# Patient Record
Sex: FEMALE | Race: WHITE | NOT HISPANIC OR LATINO
[De-identification: names, ages, dates, MRNs, and addresses within clinical notes are randomized per-mention and may not be internally consistent; named-entity substitution may affect disease eponyms.]

---

## 2021-05-04 PROBLEM — Z00.00 ENCOUNTER FOR PREVENTIVE HEALTH EXAMINATION: Status: ACTIVE | Noted: 2021-05-04

## 2021-05-05 ENCOUNTER — APPOINTMENT (OUTPATIENT)
Dept: NEUROLOGY | Facility: CLINIC | Age: 71
End: 2021-05-05
Payer: MEDICARE

## 2021-05-05 VITALS
HEIGHT: 68 IN | DIASTOLIC BLOOD PRESSURE: 72 MMHG | SYSTOLIC BLOOD PRESSURE: 130 MMHG | TEMPERATURE: 97.2 F | BODY MASS INDEX: 18.94 KG/M2 | WEIGHT: 125 LBS

## 2021-05-05 DIAGNOSIS — G62.9 POLYNEUROPATHY, UNSPECIFIED: ICD-10-CM

## 2021-05-05 DIAGNOSIS — R20.2 PARESTHESIA OF SKIN: ICD-10-CM

## 2021-05-05 PROCEDURE — 99204 OFFICE O/P NEW MOD 45 MIN: CPT

## 2021-05-05 RX ORDER — TURMERIC ROOT EXTRACT 500 MG
TABLET ORAL
Refills: 0 | Status: ACTIVE | COMMUNITY

## 2021-05-26 ENCOUNTER — APPOINTMENT (OUTPATIENT)
Dept: NEUROLOGY | Facility: CLINIC | Age: 71
End: 2021-05-26
Payer: MEDICARE

## 2021-05-26 PROCEDURE — 95910 NRV CNDJ TEST 7-8 STUDIES: CPT

## 2021-05-26 PROCEDURE — 95886 MUSC TEST DONE W/N TEST COMP: CPT

## 2021-07-01 ENCOUNTER — NON-APPOINTMENT (OUTPATIENT)
Age: 71
End: 2021-07-01

## 2021-07-01 ENCOUNTER — APPOINTMENT (OUTPATIENT)
Dept: ORTHOPEDIC SURGERY | Facility: CLINIC | Age: 71
End: 2021-07-01
Payer: MEDICARE

## 2021-07-01 VITALS
HEIGHT: 68 IN | WEIGHT: 125 LBS | BODY MASS INDEX: 18.94 KG/M2 | DIASTOLIC BLOOD PRESSURE: 79 MMHG | HEART RATE: 74 BPM | SYSTOLIC BLOOD PRESSURE: 124 MMHG

## 2021-07-01 PROCEDURE — 73590 X-RAY EXAM OF LOWER LEG: CPT | Mod: RT

## 2021-07-01 PROCEDURE — 73630 X-RAY EXAM OF FOOT: CPT | Mod: RT

## 2021-07-01 PROCEDURE — 99204 OFFICE O/P NEW MOD 45 MIN: CPT

## 2021-07-01 RX ORDER — TURM/GING/BOS/YUC/WIL/CHAM/HOR 100-100 MG
TABLET ORAL
Refills: 0 | Status: ACTIVE | COMMUNITY

## 2021-07-01 NOTE — PHYSICAL EXAM
[de-identified] : General: Alert and oriented x3. In no acute distress. Pleasant in nature with a normal affect. No apparent respiratory distress.\par Gait: compensating with medial walking. \par \par Right Foot\par Skin: Clean, dry, intact\par Inspection: No obvious malalignment, no masses, no swelling, no effusion\par Pulses: 2+ DP/PT pulses\par ROM: FOOT Full  ROM of digits, ANKLE 10 degrees of dorsiflexion, 40 degrees of plantarflexion, 10 degrees of subtalar motion.\par Painful ROM: None\par Tenderness: + 4th, 5th metatarsal pain. No tenderness over the medial malleolus, No tenderness over the lateral malleolus, no CFL/ATFL/PTFL pain, no deltoid ligament pain. No heel pain. No Achilles tenderness. No pain to the LisFranc joint. No ttp over the posterior tibial tendon.\par Stability: Negative anterior/posterior drawer.\par Strength: 5/5 ADD/ABD/TA/GS/EHL/FHL/EDL\par Neuro: Sensation in tact to light touch throughout\par Additional tests: Negative Mortons test, negative tarsal tunnel tinels, negative single heel rise. \par \par Right Knee Exam\par Skin: Clean, dry, intact\par Inspection: No obvious malalignment, no masses, no swelling, no effusion \par Pulses: 2+ DP/PT pulses\par ROM: 0-130 degrees of flexion. No pain with deep knee flexion.\par Tenderness: No MJLT. No LJLT. No pain over the patella facets. No pain to the quadriceps mechanism.\par Stability: Stable to varus, valgus, Lachman testing. Negative anterior/posterior drawer.\par Strength: 5/5 Q/H/TA/GS/EHL, no atrophy\par Neuro: In tact to light touch throughout, DTR's normal\par Additional tests: Negative McMurrays test, Negative patellar grind test. [de-identified] : 3V of the right foot were ordered, obtained, and reviewed by me today, 07/01/2021 , which revealed: no fractures\par \par 2V of the tib fib were ordered, obtained, and reviewed by me today, 07/01/2021 , which revealed: no fractures

## 2021-07-01 NOTE — DISCUSSION/SUMMARY
[de-identified] : Today I had a lengthy discussion with the patient regarding their right foot pain. I have addressed all the patient's concerns surrounding the pathology of their condition. XR films were reviewed with the patient. \par \par I recommend the patient undergo a course of physical therapy for the right foot  2-3 times a week for a total of 6-8 weeks. A prescription was given for the physical therapy today. I recommend that the patient utilize ice, heat, NSAIDs prn. They can also elevate their right foot above the level of the heart. \par \par I would like to see the patient back in the office in 6-8 weeks, prn to reassess their condition. The patient understood and verbally agreed to the treatment plan. All of their questions were answered and they were satisfied with the visit. The patient should call the office if they have any questions or experience worsening symptoms.

## 2021-07-01 NOTE — ADDENDUM
[FreeTextEntry1] : I, Zen Gutierrez, acted solely as a scribe for Dr. Clifton Funes on this date 07/01/2021  .\par  \par All medical record entries made by the Scribe were at my, Dr. Clifton Funes, direction and personally dictated by me on 07/01/2021 . I have reviewed the chart and agree that the record accurately reflects my personal performance of the history, physical exam, assessment and plan. I have also personally directed, reviewed, and agreed with the chart.

## 2021-07-01 NOTE — HISTORY OF PRESENT ILLNESS
[FreeTextEntry1] : WILEY LAGUERRE is a 70 year old female who presents for initial evaluation of right foot pain. She developed sharp pain in her knees after running up and down stairs while traveling 10 years ago. She was evaluated for arthritis in her knees. She states she had relief with physical therapy. During her sessions she develop pain in her foot.  She had a negative EMG study done on her foot. She states the pain has worsened over the past 3 months. She has tried Voltaren gel. She is using Dr. Goel's orthotics.

## 2021-07-22 ENCOUNTER — APPOINTMENT (OUTPATIENT)
Dept: ORTHOPEDIC SURGERY | Facility: CLINIC | Age: 71
End: 2021-07-22
Payer: MEDICARE

## 2021-07-22 DIAGNOSIS — M79.604 PAIN IN RIGHT LEG: ICD-10-CM

## 2021-07-22 PROCEDURE — 99214 OFFICE O/P EST MOD 30 MIN: CPT

## 2021-07-22 NOTE — DISCUSSION/SUMMARY
[de-identified] : Today I had a lengthy discussion with the patient regarding their right foot pain.I have addressed all the patient's concerns surrounding the pathology of their condition. I referred the patient to a physiatrist and vascular providers. A discussion was had about utilizing custom orthotics. The patient was educated about custom orthotics in the office today. I recommend that the patient utilize ice, NSAIDS PRN, and heat. They can also elevate their right foot above the level of the heart.		\par \par The patient understood and verbally agreed to the treatment plan. All of their questions were answered and they were satisfied with the visit. The patient should call the office if they have any questions or experience worsening symptoms. I would like to see the patient back in the office in prn to reassess their condition.

## 2021-07-22 NOTE — HISTORY OF PRESENT ILLNESS
[FreeTextEntry1] : 7/22/21: WILEY LAGUERRE is a 70 year year old female presenting for a follow-up evaluation of right foot pain. She has been attending physical therapy. She reports that her pain has not improved. When she is active, she reports minimal pain. However, when she is sitting, she reports a sharp pain.  		\par \par 7/1/21: WILEY LAGUERRE is a 70 year old female who presents for initial evaluation of right foot pain. She developed sharp pain in her knees after running up and down stairs while traveling 10 years ago. She was evaluated for arthritis in her knees. She states she had relief with physical therapy. During her sessions she develop pain in her foot.  She had a negative EMG study done on her foot. She states the pain has worsened over the past 3 months. She has tried Voltaren gel. She is using Dr. Goel's orthotics.

## 2021-07-22 NOTE — PHYSICAL EXAM
[de-identified] : General: Alert and oriented x3. In no acute distress. Pleasant in nature with a normal affect. No apparent respiratory distress.\par Gait: compensating with medial walking. \par \par Right Foot\par Skin: Clean, dry, intact\par Inspection: No obvious malalignment, no masses, no swelling, no effusion\par Pulses: 2+ DP/PT pulses\par ROM: FOOT Full  ROM of digits, ANKLE 10 degrees of dorsiflexion, 40 degrees of plantarflexion, 10 degrees of subtalar motion.\par Painful ROM: None\par Tenderness: + 4th, 5th metatarsal pain. No tenderness over the medial malleolus, No tenderness over the lateral malleolus, no CFL/ATFL/PTFL pain, no deltoid ligament pain. No heel pain. No Achilles tenderness. No pain to the LisFranc joint. No ttp over the posterior tibial tendon.\par Stability: Negative anterior/posterior drawer.\par Strength: 5/5 ADD/ABD/TA/GS/EHL/FHL/EDL\par Neuro: Sensation in tact to light touch throughout\par Additional tests: Negative Mortons test, negative tarsal tunnel tinels, negative single heel rise. \par \par Right Knee Exam\par Skin: Clean, dry, intact\par Inspection: No obvious malalignment, no masses, no swelling, no effusion \par Pulses: 2+ DP/PT pulses\par ROM: 0-130 degrees of flexion. No pain with deep knee flexion.\par Tenderness: No MJLT. No LJLT. No pain over the patella facets. No pain to the quadriceps mechanism.\par Stability: Stable to varus, valgus, Lachman testing. Negative anterior/posterior drawer.\par Strength: 5/5 Q/H/TA/GS/EHL, no atrophy\par Neuro: In tact to light touch throughout, DTR's normal\par Additional tests: Negative McMurrays test, Negative patellar grind test. [de-identified] : MRI of right calf without contrast done on 07/15/2021 at [facility] results reviewed 7/22/21, results as reported: \par \par Impressions:\par 1: Unremarkable right calf. Limited imaging of the knee reveals a tear of the medial meniscus posterior horn. \par \par Signed by: Fuentes Munson MD 7/20/21 11:32 PM EDT\par \par \par MRI of right foot done on 07/15/2021 at [facility] results reviewed 7/22/21, results as reported: \par \par Impressions: \par 1: Hallux Valgus with moderate to severe osteoarthrosis at the first MTP joint.\par 2: There is no stress fracture\par 3: Severe arthrosis at the medial metatarsosesamoid joint. There is a bipartite tibial hallux sesamoid bone with mild sesamoiditis.\par 4: Partial Thickness tear of the second MTP plantar plate involving the medial fibers.\par \par Signed by: Fuentes Munson MD 7/20/21 11:30 PM EDT

## 2021-07-22 NOTE — ADDENDUM
[FreeTextEntry1] : I, Andreia Portillo, acted solely as a scribe for Dr. Clifton Funes on this date 07/22/2021.\par \par All medical record entries made by the Scribe were at my, Dr. Clifton Funes, direction and personally dictated by me on 07/22/2021 . I have reviewed the chart and agree that the record accurately reflects my personal performance of the history, physical exam, assessment and plan. I have also personally directed, reviewed, and agreed with the chart.	\par

## 2021-08-20 ENCOUNTER — APPOINTMENT (OUTPATIENT)
Dept: VASCULAR SURGERY | Facility: CLINIC | Age: 71
End: 2021-08-20
Payer: MEDICARE

## 2021-08-20 VITALS
BODY MASS INDEX: 18.52 KG/M2 | WEIGHT: 118 LBS | TEMPERATURE: 97.7 F | SYSTOLIC BLOOD PRESSURE: 149 MMHG | DIASTOLIC BLOOD PRESSURE: 71 MMHG | HEIGHT: 67 IN | OXYGEN SATURATION: 97 % | HEART RATE: 58 BPM | RESPIRATION RATE: 16 BRPM

## 2021-08-20 PROCEDURE — 99203 OFFICE O/P NEW LOW 30 MIN: CPT

## 2021-08-20 NOTE — HISTORY OF PRESENT ILLNESS
[FreeTextEntry1] : 70 year old woman referred for evaluation of right foot and calf pain.\par Patient reports that 10 years ago she suffered a stress fracture of her right 4th metatarsal.\par Over the last few months she has been experiencing severe pain in the same location, worsened by standing and walking. She also reports trigger-point tenderness on the lateral aspect of her right calf.\par Recent MRI of her right foot demonstrates no new fractures.\par Patient denies intermittent claudication.\par She denies lower extremity swelling\par She does not smoke

## 2021-08-20 NOTE — ASSESSMENT
[FreeTextEntry1] : 70 year old female with right foot and lateral thigh pain.\par Right foot pain is at the site of a previous metatarsal injury. Right lateral calf pain is elicited by pressing on the area, but not by walking.\par Her symptoms are not consistent with vasculogenic claudication.\par Her pedal pulses are strongly palpable bilaterally\par -She has no clinical evidence of underlying PAD\par -Symptoms are not vascular in nature, likely musculoskeletal\par -No further vascular work-up warranted. Plan discussed with patient, all questions answered

## 2021-08-20 NOTE — PHYSICAL EXAM
[JVD] : no jugular venous distention  [Normal Breath Sounds] : Normal breath sounds [Normal Rate and Rhythm] : normal rate and rhythm [2+] : left 2+ [Varicose Veins Of Lower Extremities] : not present [Ankle Swelling (On Exam)] : not present [Abdomen Masses] : Abdomen masses present [] : not present [Abdomen Tenderness] : ~T ~M Abdominal tenderness [Abdominal Bruit] : abdominal bruit present [No Rash or Lesion] : No rash or lesion [Alert] : alert [Oriented to Person] : oriented to person [Oriented to Place] : oriented to place [Oriented to Time] : oriented to time [Calm] : calm [de-identified] : Well appearing [de-identified] : NCAT

## 2021-09-16 ENCOUNTER — APPOINTMENT (OUTPATIENT)
Dept: PHYSICAL MEDICINE AND REHAB | Facility: CLINIC | Age: 71
End: 2021-09-16
Payer: MEDICARE

## 2021-09-16 VITALS
DIASTOLIC BLOOD PRESSURE: 74 MMHG | WEIGHT: 120 LBS | BODY MASS INDEX: 18.19 KG/M2 | HEIGHT: 68 IN | RESPIRATION RATE: 14 BRPM | SYSTOLIC BLOOD PRESSURE: 150 MMHG | HEART RATE: 60 BPM

## 2021-09-16 DIAGNOSIS — Z78.9 OTHER SPECIFIED HEALTH STATUS: ICD-10-CM

## 2021-09-16 DIAGNOSIS — M76.821 POSTERIOR TIBIAL TENDINITIS, RIGHT LEG: ICD-10-CM

## 2021-09-16 PROCEDURE — 99204 OFFICE O/P NEW MOD 45 MIN: CPT

## 2021-09-16 NOTE — ASSESSMENT
[FreeTextEntry1] : 70 y.o. F w/ h/o chronic right ankle and foot pain.  I spent most of today's office visit (35 min) reviewing the patient's MRI, discussing pathogenesis and further non-operative management.  DDx for lateral ankle/foot pain includes metatarsalgia vs. peroneal tendinosis.  DDx for medial ankle pain includes post. tib tendinosis.  However, MRI ankle July 2021 did not show tenosynovitis or tear.  Pt.'s right hallux valgus and bunion appear asymptomatic. 2nd metatarsal plantar plate tear ? symptomatic.  Pt. already has CMOs and metatarsal pads which are not comfortable.  No EDX evidence of large fiber neuropathy affecting LE.  Discussed utility of US examination of peroneal and post tib tendons w/ possible LA block.  Pt. is in agreement with plan.  All questions answered.  RTC for US.

## 2021-09-16 NOTE — HISTORY OF PRESENT ILLNESS
[FreeTextEntry1] : 70 y.o. F presents to office w/ c/o chronic right lateral foot pain.  Pain initially began after stress fx 4th metatarsal about 10 years ago.  Pt. subsequently developed chronic lateral foot pain for several years which then abated about 2-3 years w/ periodic flare-ups.  Pt. states that she was being treated for right knee pain in P.T. this past March when her right foot began to hurt again.  Pt. consulted Dr. Funes and vascular MD.  Imaging done and reviewed below.  EMG b/l LE neg.  Pt. states that her CMO don't fit in shoes.  Last injection 2014.  Has tried metatarsal pads which are not comfortable.  Has tried Cymbalta in past but could not tolerate 2' GI side effects.

## 2021-09-16 NOTE — PHYSICAL EXAM
[FreeTextEntry1] : NAD\par A&Ox3.  Anxious\par Non-obese\par Inspection Right Ankle\par No joint swelling\par Right Halux Valgus w/ bunion deformity\par Right 2nd claw toe\par ROM: 15-20' DF; 40' PF\par No subtalar hyperpronation\par Drawer Test: neg\par Talar Tilt: neg\par Palpation\par Talar dome: NTTP\par Sinus tarsi: mild TTP\par ATFL: NTTP\par CFL: NTTP\par DISTAL 1/3 ACHILLES TENDON: NTTP\par PL/PB TENDONS: VTTP\par PTIB TENDON: MILD TTP\par METATARSAL SQUEEZE TEST + w/ pain in hallux\par MMT: 4+/5 EVERSION\par SILT.  No decrement to PP

## 2021-09-16 NOTE — DATA REVIEWED
[MRI] : MRI [FreeTextEntry1] : MRI Right Foot: Hallux valgus with moderate to severe osteoarthrosis at the first MTP joint.  Severe arthrosis at the medial metatarsosesamoidal joint. There is a bipartite tibial hallux sesamoid bone with mild sesamoiditis.  Partial-thickness tear of the second MTP plantar plate involving the medial fibers.  There is no stress fracture.  There is no tendon tear, tendinosis, or tenosynovitis. There is no muscle atrophy or intramuscular edema.

## 2021-09-20 ENCOUNTER — TRANSCRIPTION ENCOUNTER (OUTPATIENT)
Age: 71
End: 2021-09-20

## 2021-09-27 ENCOUNTER — APPOINTMENT (OUTPATIENT)
Dept: PHYSICAL MEDICINE AND REHAB | Facility: CLINIC | Age: 71
End: 2021-09-27
Payer: MEDICARE

## 2021-09-27 PROCEDURE — 76882 US LMTD JT/FCL EVL NVASC XTR: CPT | Mod: RT

## 2021-09-27 PROCEDURE — 99214 OFFICE O/P EST MOD 30 MIN: CPT | Mod: 25

## 2021-09-27 NOTE — ASSESSMENT
[FreeTextEntry1] : 71 y.o. F w/ h/o chronic right ankle and foot pain.  I spent most of today's office visit (25 min) reviewing and performing the patient's MSK US examination right lateral ankle, discussing pathogenesis and further non-operative management.  DDx for lateral ankle/foot pain includes peroneal tendinosis +/- 4th/5th metatarsalgia.  Not much medial ankle foot pain but DDx includes post. tib tendinosis.  However, MRI ankle July 2021 did not show tenosynovitis or tear.  Pt.'s right hallux valgus and bunion appear asymptomatic.  2nd metatarsal plantar plate tear ? symptomatic.  Pt. already has CMOs and metatarsal pads which are not comfortable.  No EDX evidence of large fiber neuropathy affecting LE.  Discussed R/B/A to US guided right ankle peroneal tendon LA block.  If pt. achieves > 70-80% pain relief post procedure will then consider regenerative medicine procedure (ie, PRP injection).  Pt. is in agreement with plan.  All questions answered.  RTC for LA injection.

## 2021-09-27 NOTE — HISTORY OF PRESENT ILLNESS
[FreeTextEntry1] : 71 y.o. F w/ h/o chronic right ankle and foot pain returns to office for MSK US examination right ankle.  Pt. reports that her right ankle/foot pain is now mostly lateral and distal to ankle.

## 2021-09-27 NOTE — PHYSICAL EXAM
[FreeTextEntry1] : NAD\par A&Ox3.  Anxious\par Non-obese\par Inspection Right Ankle\par No significant change in today's examination\par No joint swelling\par Right Halux Valgus w/ bunion deformity\par Right 2nd claw toe\par ROM: 15-20' DF; 40' PF\par No subtalar hyperpronation\par Drawer Test: neg\par Talar Tilt: neg\par Palpation\par Talar dome: NTTP\par Sinus tarsi: mild TTP\par ATFL: NTTP\par CFL: NTTP\par DISTAL 1/3 ACHILLES TENDON: NTTP\par PL/PB TENDONS: VTTP\par PTIB TENDON: MILD TTP\par METATARSAL SQUEEZE TEST + w/ pain in hallux\par MMT: 4+/5 EVERSION\par SILT.  No decrement to PP

## 2021-09-27 NOTE — PROCEDURE
[de-identified] : MSK US EXAMINATION RIGHT ANKLE\par \par LATERAL\par * PERONEUS LONGUS & BREVIS TENDONS APPEAR SOMEWHAT HYPOECHOIC W/ DISORGANIZED FIBERS\par * NO DEMONSTRABLE FLUID FILLED DEFECT OR TEAR\par * CONCORDANT PAIN SIGHTLY PROXIMAL TO AND DISTAL TO LATERAL MALLEOLUS WITH SONOPALPATION\par \par

## 2021-10-01 ENCOUNTER — APPOINTMENT (OUTPATIENT)
Dept: PHYSICAL MEDICINE AND REHAB | Facility: CLINIC | Age: 71
End: 2021-10-01
Payer: MEDICARE

## 2021-10-01 VITALS
DIASTOLIC BLOOD PRESSURE: 82 MMHG | BODY MASS INDEX: 18.19 KG/M2 | RESPIRATION RATE: 14 BRPM | HEIGHT: 68 IN | WEIGHT: 120 LBS | SYSTOLIC BLOOD PRESSURE: 151 MMHG | HEART RATE: 80 BPM

## 2021-10-01 DIAGNOSIS — M79.671 PAIN IN RIGHT FOOT: ICD-10-CM

## 2021-10-01 PROCEDURE — 20550 NJX 1 TENDON SHEATH/LIGAMENT: CPT | Mod: RT

## 2021-10-13 ENCOUNTER — NON-APPOINTMENT (OUTPATIENT)
Age: 71
End: 2021-10-13

## 2021-10-18 ENCOUNTER — APPOINTMENT (OUTPATIENT)
Dept: PHYSICAL MEDICINE AND REHAB | Facility: CLINIC | Age: 71
End: 2021-10-18
Payer: SELF-PAY

## 2021-10-18 PROCEDURE — 0232T NJX PLATELET PLASMA: CPT | Mod: RT

## 2021-11-03 ENCOUNTER — APPOINTMENT (OUTPATIENT)
Dept: PHYSICAL MEDICINE AND REHAB | Facility: CLINIC | Age: 71
End: 2021-11-03
Payer: MEDICARE

## 2021-11-03 VITALS
WEIGHT: 120 LBS | HEART RATE: 84 BPM | TEMPERATURE: 97.1 F | DIASTOLIC BLOOD PRESSURE: 84 MMHG | HEIGHT: 68 IN | SYSTOLIC BLOOD PRESSURE: 158 MMHG | BODY MASS INDEX: 18.19 KG/M2

## 2021-11-03 PROCEDURE — 99214 OFFICE O/P EST MOD 30 MIN: CPT

## 2021-11-03 NOTE — PHYSICAL EXAM
[FreeTextEntry1] : NAD\par A&Ox3.  Anxious\par Non-obese\par Inspection Right Ankle\par Significant change in today's examination\par No joint swelling\par Right Halux Valgus w/ bunion deformity\par Right 2nd claw toe\par ROM: 15-20' DF; 40' PF\par No subtalar hyperpronation\par Drawer Test: neg\par Talar Tilt: neg\par Palpation\par Talar dome: NTTP\par Sinus tarsi: mild TTP\par ATFL: NTTP\par CFL: NTTP\par DISTAL 1/3 ACHILLES TENDON: NTTP\par PL/PB TENDONS: NTTP (improved since pre-PRP)\par PTIB TENDON: MILD TTP\par METATARSAL SQUEEZE TEST + w/ pain in hallux\par MMT: 5/5 EVERSION (improved)\par SILT.  No decrement to PP
normal...

## 2021-11-03 NOTE — DATA REVIEWED
[MRI] : MRI [FreeTextEntry1] : MSK US EXAMINATION RIGHT ANKLE (9/27/21)\par \par LATERAL\par * PERONEUS LONGUS & BREVIS TENDONS APPEAR SOMEWHAT HYPOECHOIC W/ DISORGANIZED FIBERS\par * NO DEMONSTRABLE FLUID FILLED DEFECT OR TEAR\par * CONCORDANT PAIN SIGHTLY PROXIMAL TO AND DISTAL TO LATERAL MALLEOLUS WITH SONOPALPATION\par \par MRI Right Foot: Hallux valgus with moderate to severe osteoarthrosis at the first MTP joint.  Severe arthrosis at the medial metatarsosesamoidal joint. There is a bipartite tibial hallux sesamoid bone with mild sesamoiditis.  Partial-thickness tear of the second MTP plantar plate involving the medial fibers.  There is no stress fracture.  There is no tendon tear, tendinosis, or tenosynovitis. There is no muscle atrophy or intramuscular edema.

## 2021-11-03 NOTE — HISTORY OF PRESENT ILLNESS
[FreeTextEntry1] : 71 y.o. F w/ h/o chronic right ankle and foot pain returns to office for f/u after US guided right peroneal tendon PRP injection (10/18/21).  Pt. states that she is doing very well post-procedure.  She reports 80-90% pain relief.  Her P.T. is weaning her out of walking boot.  She was supposed to have EMG/NCS L LE today but does not have persistent N/T/P in leg or foot.  She does endorse some "shooting" pain in lateral aspect right leg.  Not taking any NSAIDs or Tylenol.

## 2021-11-03 NOTE — ASSESSMENT
[FreeTextEntry1] : 71 y.o. F w/ h/o chronic right ankle and foot pain doing very well s/p US guided R peroneal tendon PRP injection (10/18/21).  I spent most of today's office visit (20 min) discussing pathogenesis and further non-operative management.  May still have component of right foot 4th/5th metatarsalgia.  Not much medial ankle/foot pain but DDx includes post. tib tendinosis.  However, MRI ankle July 2021 did not show tenosynovitis or tear.  Pt.'s right hallux valgus and bunion likely asymptomatic.  2nd metatarsal plantar plate tear ? symptomatic.   No EDX evidence of large fiber neuropathy affecting LE.   Advised pt. to c/w P.T., wean out of walking boot and progressively increase her activity as tolerated.  Pt. is in agreement with plan.  All questions answered.  RTC 4-6 weeks.

## 2021-11-05 ENCOUNTER — TRANSCRIPTION ENCOUNTER (OUTPATIENT)
Age: 71
End: 2021-11-05

## 2021-12-03 ENCOUNTER — APPOINTMENT (OUTPATIENT)
Dept: PHYSICAL MEDICINE AND REHAB | Facility: CLINIC | Age: 71
End: 2021-12-03

## 2021-12-16 ENCOUNTER — APPOINTMENT (OUTPATIENT)
Dept: PHYSICAL MEDICINE AND REHAB | Facility: CLINIC | Age: 71
End: 2021-12-16
Payer: MEDICARE

## 2021-12-16 VITALS
WEIGHT: 120 LBS | SYSTOLIC BLOOD PRESSURE: 146 MMHG | HEART RATE: 74 BPM | HEIGHT: 68 IN | RESPIRATION RATE: 14 BRPM | BODY MASS INDEX: 18.19 KG/M2 | DIASTOLIC BLOOD PRESSURE: 79 MMHG

## 2021-12-16 DIAGNOSIS — M77.41 METATARSALGIA, RIGHT FOOT: ICD-10-CM

## 2021-12-16 PROCEDURE — 99214 OFFICE O/P EST MOD 30 MIN: CPT

## 2021-12-16 NOTE — ASSESSMENT
[FreeTextEntry1] : 71 y.o. F w/ h/o chronic right lateral ankle and foot pain s/p US guided R peroneal tendon PRP injection (10/18/21).  I spent most of today's office visit (20 min) discussing pathogenesis and further non-operative management.  I believe that pt. did benefit from the PRP injection as she has less distal peroneal tendon pain around insertion on 5th metatarsal.  May still have component of right foot 4th/5th metatarsalgia.  Not much medial ankle/foot pain but DDx includes post. tib tendinosis.  However, MRI ankle July 2021 did not show tenosynovitis or tear.  Pt.'s right hallux valgus and bunion likely asymptomatic.  2nd metatarsal plantar plate tear ? symptomatic.   No EDX evidence of large fiber neuropathy affecting LE.   Advised pt. to c/w HEP and maintain her level of physical activity.  Pt. is in agreement with plan.  All questions answered.  RTC 3 months.

## 2021-12-16 NOTE — DATA REVIEWED
[MRI] : MRI [FreeTextEntry1] : MSK US EXAMINATION RIGHT ANKLE (9/27/21)\par \par LATERAL\par * PERONEUS LONGUS & BREVIS TENDONS APPEAR SOMEWHAT HYPOECHOIC W/ DISORGANIZED FIBERS\par * NO DEMONSTRABLE FLUID FILLED DEFECT OR TEAR\par * CONCORDANT PAIN SIGHTLY PROXIMAL TO AND DISTAL TO LATERAL MALLEOLUS WITH SONOPALPATION\par \par MRI Right Foot: Hallux valgus with moderate to severe osteoarthrosis at the first MTP joint.  Severe arthrosis at the medial metatarsosesamoidal joint. There is a bipartite tibial hallux sesamoid bone with mild sesamoiditis.  Partial-thickness tear of the second MTP plantar plate involving the medial fibers.  There is no stress fracture.  There is no tendon tear, tendinosis, or tenosynovitis. There is no muscle atrophy or intramuscular edema.\par \par MRI RIGHT CALF (July 2021): WNL\par \par EMG B/L LE: NORMAL

## 2021-12-16 NOTE — PHYSICAL EXAM
[FreeTextEntry1] : NAD\par A&Ox3.  Anxious\par Non-obese\par Inspection Right Ankle\par Significant change in today's examination\par No joint swelling\par Right Halux Valgus w/ bunion deformity\par Right 2nd claw toe\par ROM: 15-20' DF; 40' PF\par No subtalar hyperpronation\par Drawer Test: neg\par Talar Tilt: neg\par Palpation\par Talar dome: NTTP\par Sinus tarsi: mild TTP\par ATFL: NTTP\par CFL: NTTP\par DISTAL 1/3 ACHILLES TENDON: NTTP\par PL/PB TENDONS: somewhat TTP (but improved since pre-PRP)\par PTIB TENDON: MILD TTP\par METATARSAL SQUEEZE TEST + w/ pain in hallux\par MMT: 5/5 EVERSION (improved)\par SILT.  No decrement to PP

## 2021-12-16 NOTE — HISTORY OF PRESENT ILLNESS
[FreeTextEntry1] : 71 y.o. F w/ h/o chronic right ankle and foot pain s/p US guided R peroneal tendon PRP injection (10/18/21) returns to office for f/u.  Pt. is now c/o episodic lateral ankle pain worse at rest - not with increased activity or walking.  Worse lying down at night.  C/o burning discomfort in knees while at rest but denies N/T/B in legs or feet.  Already had EMG B/L LE which was normal.

## 2022-03-16 ENCOUNTER — APPOINTMENT (OUTPATIENT)
Dept: PHYSICAL MEDICINE AND REHAB | Facility: CLINIC | Age: 72
End: 2022-03-16
Payer: MEDICARE

## 2022-03-16 VITALS — BODY MASS INDEX: 18.19 KG/M2 | HEART RATE: 76 BPM | HEIGHT: 68 IN | TEMPERATURE: 97.1 F | WEIGHT: 120 LBS

## 2022-03-16 DIAGNOSIS — M25.571 PAIN IN RIGHT ANKLE AND JOINTS OF RIGHT FOOT: ICD-10-CM

## 2022-03-16 PROCEDURE — 76882 US LMTD JT/FCL EVL NVASC XTR: CPT | Mod: RT

## 2022-03-16 PROCEDURE — 99214 OFFICE O/P EST MOD 30 MIN: CPT

## 2022-03-16 NOTE — ASSESSMENT
[FreeTextEntry1] : 71 y.o. F w/ h/o chronic right lateral ankle and foot pain s/p US guided R peroneal tendon PRP injection (10/18/21) w/ return of painful sxs.  I spent most of today's office visit (25 min) reviewing and performing the patient's US examination, discussing pathogenesis and further non-operative management.  US examination findings are consistent with peroneal tendinosis at level of LM but will obtain new MRI R ankle for larger field of view.  May still have component of right foot 4th/5th metatarsalgia.  Not much medial ankle/foot pain but DDx includes post. tib tendinosis.  Pt.'s right hallux valgus and bunion likely asymptomatic.  2nd metatarsal plantar plate tear ? symptomatic.  No EDX evidence of large fiber neuropathy affecting LE.  Advised pt. to c/w HEP and maintain her level of physical activity.  May c/w lace up ankle brace for support.  Pt. is in agreement with plan.  All questions answered.  Will have telephone f/u after MRI.

## 2022-03-16 NOTE — PHYSICAL EXAM
[FreeTextEntry1] : NAD\par A&Ox3.  Anxious\par Non-obese\par Inspection Right Ankle\par No significant change in today's examination\par No joint swelling\par Right Halux Valgus w/ bunion deformity\par Right 2nd claw toe\par ROM: 15-20' DF; 40' PF\par No subtalar hyperpronation\par Drawer Test: neg\par Talar Tilt: neg\par Palpation\par Talar dome: NTTP\par Sinus tarsi: mild TTP\par ATFL: NTTP\par CFL: NTTP\par DISTAL 1/3 ACHILLES TENDON: NTTP\par PL/PB TENDONS: somewhat TTP (but improved since pre-PRP)\par PTIB TENDON: MILD TTP\par METATARSAL SQUEEZE TEST + w/ pain in hallux\par MMT: 5/5 EVERSION (improved)\par SILT.  No decrement to PP

## 2022-03-16 NOTE — PROCEDURE
[de-identified] : REPEAT MSK US EXAMINATION RIGHT ANKLE (03/16/22)\par \par LATERAL\par * PERONEUS LONGUS & BREVIS TENDONS APPEAR MILDLY HYPOECHOIC AND MILDLY SWOLLEN AT LEVEL OF LATERAL MALLEOLUS\par * PERONEUS BREVUS TENDON SEEN ATTACHING AT BASE 5TH METATARSAL\par * AGAIN NO DEMONSTRABLE FLUID FILLED DEFECT OR TEAR\par * CALCANEOFIBULAR LIGAMENT INTACT\par

## 2022-03-16 NOTE — HISTORY OF PRESENT ILLNESS
[FreeTextEntry1] : 71 y.o. F w/ h/o chronic right lateral ankle and foot pain s/p US guided R peroneal tendon PRP injection (10/18/21) returns to office for f/u and repeat [facility] US evaluation right lateral ankle.  Pt. still c/o pain superior and inferior to lateral malleolus worse with lying down to sleep better with rest.  She is now wearing a lace up ankle brace for support.